# Patient Record
(demographics unavailable — no encounter records)

---

## 2024-12-23 NOTE — DISCUSSION/SUMMARY
[FreeTextEntry1] : Continue balanced diet with all food groups. Brush teeth twice a day with toothbrush. Recommend visit to dentist. Help child to maintain consistent daily routines and sleep schedule. School discussed. Ensure home is safe. Teach child about personal safety. Use consistent, positive discipline. Limit screen time to no more than 2 hours per day. Encourage physical activity. Child needs to ride in a belt-positioning booster seat until  4 feet 9 inches has been reached and are between 8 and 12 years of age.   Return 1 year for routine well child check. vitamins  refuses flu vaccine,  height checked twice (previous error)

## 2024-12-23 NOTE — HISTORY OF PRESENT ILLNESS
[Mother] : mother [Normal] : Normal [Brushing teeth] : Brushing teeth [Grade ___] : Grade [unfilled] [No] : No cigarette smoke exposure [YES] : Yes [Are there any unlocked firearms stored in your household?] : There are unlocked firearms in the household. [Are there any children in your household?] : There are children in the household. [Have you attended a firearm safety workshop or class?] : A firearm safety workshop or class has been attended. [Up to date] : Up to date [de-identified] : all foods  [de-identified] : rfuses Hep A [Are there any firearms stored in your household that are loaded?] : No firearms are stored in the household loaded. [Has anyone in the household been feeling low/depressed/been struggling?] : No one in the household has been feeling low/depressed/been struggling. [FreeTextEntry1] : MELONIE JAIN is a 6 year here for well